# Patient Record
Sex: MALE | Race: WHITE | Employment: OTHER | URBAN - METROPOLITAN AREA
[De-identification: names, ages, dates, MRNs, and addresses within clinical notes are randomized per-mention and may not be internally consistent; named-entity substitution may affect disease eponyms.]

---

## 2023-08-11 ENCOUNTER — HOSPITAL ENCOUNTER (EMERGENCY)
Age: 78
Discharge: HOME | End: 2023-08-11
Attending: EMERGENCY MEDICINE
Payer: MEDICARE

## 2023-08-11 ENCOUNTER — APPOINTMENT (OUTPATIENT)
Dept: CT IMAGING | Age: 78
End: 2023-08-11
Payer: MEDICARE

## 2023-08-11 VITALS
RESPIRATION RATE: 16 BRPM | DIASTOLIC BLOOD PRESSURE: 76 MMHG | WEIGHT: 165 LBS | OXYGEN SATURATION: 95 % | BODY MASS INDEX: 23.1 KG/M2 | HEART RATE: 77 BPM | SYSTOLIC BLOOD PRESSURE: 131 MMHG | TEMPERATURE: 98.7 F | HEIGHT: 71 IN

## 2023-08-11 DIAGNOSIS — K40.90 UNILATERAL INGUINAL HERNIA WITHOUT OBSTRUCTION OR GANGRENE, RECURRENCE NOT SPECIFIED: ICD-10-CM

## 2023-08-11 DIAGNOSIS — K57.32 DIVERTICULITIS OF COLON: Primary | ICD-10-CM

## 2023-08-11 LAB
ALBUMIN SERPL-MCNC: 4.1 G/DL (ref 3.5–5.2)
ALP SERPL-CCNC: 69 U/L (ref 40–129)
ALT SERPL-CCNC: 11 U/L (ref 0–40)
ANION GAP SERPL CALCULATED.3IONS-SCNC: 8 MMOL/L (ref 7–16)
AST SERPL-CCNC: 27 U/L (ref 0–39)
BACTERIA URNS QL MICRO: ABNORMAL
BASOPHILS # BLD: 0.02 K/UL (ref 0–0.2)
BASOPHILS NFR BLD: 0 % (ref 0–2)
BILIRUB SERPL-MCNC: 0.6 MG/DL (ref 0–1.2)
BILIRUB UR QL STRIP: NEGATIVE
BUN SERPL-MCNC: 26 MG/DL (ref 6–23)
CALCIUM SERPL-MCNC: 9.6 MG/DL (ref 8.6–10.2)
CHLORIDE SERPL-SCNC: 106 MMOL/L (ref 98–107)
CLARITY UR: CLEAR
CO2 SERPL-SCNC: 24 MMOL/L (ref 22–29)
COLOR UR: YELLOW
CREAT SERPL-MCNC: 1 MG/DL (ref 0.7–1.2)
EOSINOPHIL # BLD: 0.04 K/UL (ref 0.05–0.5)
EOSINOPHILS RELATIVE PERCENT: 1 % (ref 0–6)
ERYTHROCYTE [DISTWIDTH] IN BLOOD BY AUTOMATED COUNT: 18 % (ref 11.5–15)
GFR SERPL CREATININE-BSD FRML MDRD: >60 ML/MIN/1.73M2
GLUCOSE SERPL-MCNC: 158 MG/DL (ref 74–99)
GLUCOSE UR STRIP-MCNC: NEGATIVE MG/DL
HCT VFR BLD AUTO: 42.9 % (ref 37–54)
HGB BLD-MCNC: 12.5 G/DL (ref 12.5–16.5)
HGB UR QL STRIP.AUTO: NEGATIVE
IMM GRANULOCYTES # BLD AUTO: <0.03 K/UL (ref 0–0.58)
IMM GRANULOCYTES NFR BLD: 0 % (ref 0–5)
KETONES UR STRIP-MCNC: NEGATIVE MG/DL
LEUKOCYTE ESTERASE UR QL STRIP: ABNORMAL
LYMPHOCYTES NFR BLD: 0.69 K/UL (ref 1.5–4)
LYMPHOCYTES RELATIVE PERCENT: 9 % (ref 20–42)
MCH RBC QN AUTO: 25.6 PG (ref 26–35)
MCHC RBC AUTO-ENTMCNC: 29.1 G/DL (ref 32–34.5)
MCV RBC AUTO: 87.7 FL (ref 80–99.9)
MONOCYTES NFR BLD: 0.45 K/UL (ref 0.1–0.95)
MONOCYTES NFR BLD: 6 % (ref 2–12)
NEUTROPHILS NFR BLD: 83 % (ref 43–80)
NEUTS SEG NFR BLD: 6.1 K/UL (ref 1.8–7.3)
NITRITE UR QL STRIP: NEGATIVE
PH UR STRIP: 6 [PH] (ref 5–9)
PLATELET # BLD AUTO: 315 K/UL (ref 130–450)
PMV BLD AUTO: 10.5 FL (ref 7–12)
POTASSIUM SERPL-SCNC: 4 MMOL/L (ref 3.5–5)
PROT SERPL-MCNC: 6.7 G/DL (ref 6.4–8.3)
PROT UR STRIP-MCNC: NEGATIVE MG/DL
RBC # BLD AUTO: 4.89 M/UL (ref 3.8–5.8)
RBC #/AREA URNS HPF: ABNORMAL /HPF
SODIUM SERPL-SCNC: 138 MMOL/L (ref 132–146)
SP GR UR STRIP: 1.02 (ref 1–1.03)
UROBILINOGEN UR STRIP-ACNC: 0.2 EU/DL (ref 0–1)
WBC #/AREA URNS HPF: ABNORMAL /HPF
WBC OTHER # BLD: 7.3 K/UL (ref 4.5–11.5)

## 2023-08-11 PROCEDURE — 85025 COMPLETE CBC W/AUTO DIFF WBC: CPT

## 2023-08-11 PROCEDURE — 80053 COMPREHEN METABOLIC PANEL: CPT

## 2023-08-11 PROCEDURE — 74176 CT ABD & PELVIS W/O CONTRAST: CPT

## 2023-08-11 PROCEDURE — 99284 EMERGENCY DEPT VISIT MOD MDM: CPT

## 2023-08-11 PROCEDURE — 81001 URINALYSIS AUTO W/SCOPE: CPT

## 2023-08-11 RX ORDER — METRONIDAZOLE 500 MG/1
500 TABLET ORAL 4 TIMES DAILY
Qty: 40 TABLET | Refills: 0 | Status: SHIPPED | OUTPATIENT
Start: 2023-08-11 | End: 2023-08-21

## 2023-08-11 RX ORDER — CEFDINIR 300 MG/1
300 CAPSULE ORAL 2 TIMES DAILY
Qty: 20 CAPSULE | Refills: 0 | Status: SHIPPED | OUTPATIENT
Start: 2023-08-11 | End: 2023-08-21

## 2023-08-11 ASSESSMENT — ENCOUNTER SYMPTOMS
EYES NEGATIVE: 1
ABDOMINAL DISTENTION: 1
RESPIRATORY NEGATIVE: 1

## 2023-08-11 ASSESSMENT — LIFESTYLE VARIABLES
HOW MANY STANDARD DRINKS CONTAINING ALCOHOL DO YOU HAVE ON A TYPICAL DAY: PATIENT DOES NOT DRINK
HOW OFTEN DO YOU HAVE A DRINK CONTAINING ALCOHOL: NEVER

## 2023-08-11 NOTE — ED PROVIDER NOTES
Shared BRENT-ED Attending Visit. CC: No          [address] ENCOUNTER        Pt Name: Pratibha Haywood  MRN: [id number]  [address] [facility] 1945  Date of evaluation: 8/11/2023  Provider: BG Hubbard CNP  PCP: Ambika Wolf DO  Note Started: 1:53 PM EDT 8/11/23       I have seen and evaluated this patient with my supervising physician Alis Levi MD.      [address]       Chief Complaint   Patient presents with    Urinary Retention     Concerned for urinary retention, wife states he is urinating but his bladder is distended. Hx of bladder stones and concerned for stones passing. Left groin pain. HISTORY OF PRESENT ILLNESS: 1 or more Elements     History from : Patient    Limitations to history : None    Pratibha Haywood is a 69 y.o. male who presents with complaints concern for urinary retention and states he is having pressure at his bladder and is distended. The wife who assist with the history states that the patient has a history of bladder stones and is concerned for stone. He is also complaining of left groin discomfort. He states the left groin discomfort began approximately 2 days ago. He has never been to this facility and there are no old records available. He does follow with urology in the [facility] EMERGENCY SERVICE area. He denies any dysuria symptoms. Denies any back pain. Denies any fever, body aches or chills. Denies any chest pain or shortness of breath. Nursing Notes were all reviewed and agreed with or any disagreements were addressed in the HPI. REVIEW OF SYSTEMS :      Review of Systems   Constitutional: Negative. HENT: Negative. Eyes: Negative. Respiratory: Negative. Cardiovascular: Negative. Gastrointestinal:  Positive for abdominal distention. Genitourinary: Negative. Musculoskeletal: Negative. Neurological: Negative.       Positives and Pertinent negatives as per records available. He does follow with urology in the [facility] EMERGENCY SERVICE area. He denies any dysuria symptoms. Denies any back pain. Denies any fever, body aches or chills. Denies any chest pain or shortness of breath. Sodium of 138 potassium of 4.0 BUN of 26 creatinine of 1.0 WBC is 7.3 urinalysis is negative. was examined by Dr. Chaitanya Cortez at 3427, he was able to successfully reduce the left inguinal hernia. The patient and his wife both made aware of the abnormal findings on the CAT scan which revealed a mild acute diverticulitis involving the proximal sigmoid colon which is herniated into the left inguinal canal.  There is also diverticulosis 6 mm calculus in the proximal right ureter as well as 3 bladder calculi approximately 6 mm. Patient will be started on antibiotics to cover the diverticulitis and provided with information to follow-up with general surgery Dr. Bertin Cuevas regarding the inguinal hernia. Disposition Considerations (include 1 Tests not done, Shared Decision Making, Pt Expectation of Test or Tx.):   Appropriate for outpatient management        I am the Primary Clinician of Record. FINAL IMPRESSION      1. Diverticulitis of colon    2.  Unilateral inguinal hernia without obstruction or gangrene, recurrence not specified          DISPOSITION/PLAN     DISPOSITION Decision To Discharge 08/11/2023 02:07:00 PM      PATIENT REFERRED TO:  Jeffrey Arellano MD  [address] [place] [address] [phone]    Call in 1 day  for follow up      DISCHARGE MEDICATIONS:  Discharge Medication List as of 8/11/2023  2:18 PM        START taking these medications    Details   cefdinir (OMNICEF) 300 MG capsule Take 1 capsule by mouth 2 times daily for 10 days, Disp-20 capsule, R-0Normal      metroNIDAZOLE (FLAGYL) 500 MG tablet Take 1 tablet by mouth 4 times daily for 10 days, Disp-40 tablet, R-0Normal             DISCONTINUED MEDICATIONS:  Discharge Medication List as of 8/11/2023  2:18 PM